# Patient Record
Sex: FEMALE | Race: WHITE | URBAN - METROPOLITAN AREA
[De-identification: names, ages, dates, MRNs, and addresses within clinical notes are randomized per-mention and may not be internally consistent; named-entity substitution may affect disease eponyms.]

---

## 2021-11-18 ENCOUNTER — OFFICE VISIT (OUTPATIENT)
Dept: URBAN - METROPOLITAN AREA CLINIC 76 | Facility: CLINIC | Age: 66
End: 2021-11-18
Payer: MEDICARE

## 2021-11-18 DIAGNOSIS — H16.223 KERATOCONJUNCT SICCA, NOT SPECIFIED AS SJOGREN'S, BILATERAL: Primary | ICD-10-CM

## 2021-11-18 DIAGNOSIS — H10.45 OTHER CHRONIC ALLERGIC CONJUNCTIVITIS: ICD-10-CM

## 2021-11-18 PROCEDURE — 99203 OFFICE O/P NEW LOW 30 MIN: CPT | Performed by: OPTOMETRIST

## 2021-11-18 RX ORDER — FLUOROMETHOLONE 1 MG/ML
0.1 % SOLUTION/ DROPS OPHTHALMIC
Qty: 10 | Refills: 0 | Status: INACTIVE
Start: 2021-11-18 | End: 2021-11-19

## 2021-11-18 ASSESSMENT — INTRAOCULAR PRESSURE
OD: 13
OS: 13

## 2021-11-18 NOTE — IMPRESSION/PLAN
Impression: Keratoconjunct sicca, not specified as Sjogren's, bilateral: I89.987. Bilateral. Plan: Discussed diagnosis in detail with patient. Warm compresses with lid massage from top / down, bottom / up, and sweep from inside / out x 2. Patient instructed to use emulsive base lubricant 3-4 x a day. Increase omega foods/nuts and/or Borage/Fish/Krill oil supplement 1500-2500mg capsule orally per day. Continue Xiidra OU. Start FML TID OU. RTC 1 month.

## 2022-01-03 ENCOUNTER — OFFICE VISIT (OUTPATIENT)
Dept: URBAN - METROPOLITAN AREA CLINIC 76 | Facility: CLINIC | Age: 67
End: 2022-01-03
Payer: MEDICARE

## 2022-01-03 PROCEDURE — 99213 OFFICE O/P EST LOW 20 MIN: CPT | Performed by: OPTOMETRIST

## 2022-01-03 ASSESSMENT — INTRAOCULAR PRESSURE
OD: 12
OS: 12

## 2022-01-03 NOTE — IMPRESSION/PLAN
Impression: Keratoconjunct sicca, not specified as Sjogren's, bilateral: G83.101. Bilateral. improvement OU. Plan: Discussed diagnosis in detail with patient. Warm compresses with lid massage from top / down, bottom / up, and sweep from inside / out x 2. Patient instructed to use emulsive base lubricant 3-4 x a day. Increase omega foods/nuts and/or Borage/Fish/Krill oil supplement 1500-2500mg capsule orally per day. Continue Xiidra BID OU and FML QD OD x 2 weeks then d/c. Start Vista Dry eye formula BID PO.

## 2022-07-12 ENCOUNTER — OFFICE VISIT (OUTPATIENT)
Dept: URBAN - METROPOLITAN AREA CLINIC 76 | Facility: CLINIC | Age: 67
End: 2022-07-12
Payer: MEDICARE

## 2022-07-12 DIAGNOSIS — H16.223 KERATOCONJUNCT SICCA, NOT SPECIFIED AS SJOGREN'S, BILATERAL: Primary | ICD-10-CM

## 2022-07-12 PROCEDURE — 99213 OFFICE O/P EST LOW 20 MIN: CPT | Performed by: OPTOMETRIST

## 2022-07-12 ASSESSMENT — INTRAOCULAR PRESSURE
OD: 12
OS: 12

## 2022-07-12 NOTE — IMPRESSION/PLAN
Impression: Keratoconjunct sicca, not specified as Sjogren's, bilateral: H74.635. Bilateral. improvement OU. Plan: Discussed diagnosis in detail with patient. Continue warm compresses with lid massage from top / down, bottom / up, and sweep from inside / out x 2. Patient instructed to use emulsive base lubricant 3-4 x a day. Increase omega foods/nuts and/or Borage/Fish/Krill oil supplement 1500-2500mg capsule orally per day. Continue Xiidra BID OU and Vista Dry eye formula BID PO. Recommend scheduling CEE under vision ins.  or if no vision coverage keep refraction only appointment and schedule 6 mos DE.

## 2022-07-28 ENCOUNTER — TESTING ONLY (OUTPATIENT)
Dept: URBAN - METROPOLITAN AREA CLINIC 76 | Facility: CLINIC | Age: 67
End: 2022-07-28

## 2022-07-28 DIAGNOSIS — H52.4 PRESBYOPIA: Primary | ICD-10-CM

## 2022-07-28 ASSESSMENT — VISUAL ACUITY
OS: 20/25
OD: 20/20

## 2022-07-28 ASSESSMENT — KERATOMETRY
OD: 41.13
OS: 40.75

## 2023-01-11 ENCOUNTER — OFFICE VISIT (OUTPATIENT)
Dept: URBAN - METROPOLITAN AREA CLINIC 76 | Facility: CLINIC | Age: 68
End: 2023-01-11
Payer: MEDICARE

## 2023-01-11 DIAGNOSIS — H53.2 DIPLOPIA: Primary | ICD-10-CM

## 2023-01-11 DIAGNOSIS — H16.223 KERATOCONJUNCT SICCA, NOT SPECIFIED AS SJOGREN'S, BILATERAL: ICD-10-CM

## 2023-01-11 DIAGNOSIS — H40.1131 PRIMARY OPEN-ANGLE GLAUCOMA, BILATERAL, MILD STAGE: ICD-10-CM

## 2023-01-11 PROCEDURE — 76514 ECHO EXAM OF EYE THICKNESS: CPT | Performed by: OPTOMETRIST

## 2023-01-11 PROCEDURE — 92133 CPTRZD OPH DX IMG PST SGM ON: CPT | Performed by: OPTOMETRIST

## 2023-01-11 PROCEDURE — 99214 OFFICE O/P EST MOD 30 MIN: CPT | Performed by: OPTOMETRIST

## 2023-01-11 RX ORDER — LATANOPROST 50 UG/ML
0.005 % SOLUTION OPHTHALMIC
Qty: 2.5 | Refills: 0 | Status: ACTIVE
Start: 2023-01-11

## 2023-01-11 ASSESSMENT — INTRAOCULAR PRESSURE
OD: 14
OS: 13

## 2023-01-11 ASSESSMENT — KERATOMETRY
OD: 40.88
OS: 40.75

## 2023-01-11 NOTE — IMPRESSION/PLAN
Impression: Diplopia: H53.2. Bilateral. s/p strabismus sx OU 1989. Hx of MS. Pt's last MRI did not detect any new lesions. Possibly secondary to vascular disease? Plan: Discussed with patient. Discussed prism in gls vs monitoring w/o treatment. Pt elects to continue monitoring w/o treatment. Recommend patient see PCP for vascular work up. Recommend patient take a low dose aspirin to help with any vascular issues. Pt to call with any concerns.

## 2023-01-11 NOTE — IMPRESSION/PLAN
Impression: Primary open-angle glaucoma, bilateral, mild stage: H40.1131 Bilateral.  Family hx of glaucoma - mother and brother. ON appearance. Ordered and performed RNFL OCT and Pachs today. 1/11/23 Pachs: normal OU
1/11/23 RNFL OCT: OU mild NFL thinning sup > inf. Plan: Discussed with patient. Start Latanoprost QHS OU. Need baseline VF at next visit. Pt to call with any questions or concerns. Educated patient of the correlation between sleep apnea and glaucoma. Recommended sleep study with PCP to rule out sleep apnea. Apply pressure to tear ducts after instilling drops, then close eyes and roll eyes around for 10 sec. Wait 5 min between drops.

## 2023-01-11 NOTE — IMPRESSION/PLAN
Impression: Keratoconjunct sicca, not specified as Sjogren's, bilateral: Y24.037. Bilateral. improvement OU. Plan: Discussed diagnosis in detail with patient. Continue warm compresses with lid massage from top / down, bottom / up, and sweep from inside / out x 2. Patient instructed to use emulsive base lubricant 3-4 x a day. Increase omega foods/nuts and/or Borage/Fish/Krill oil supplement 1500-2500mg capsule orally per day. Continue Xiidra BID OU and Vista Dry eye formula BID PO.

## 2023-02-08 ENCOUNTER — OFFICE VISIT (OUTPATIENT)
Dept: URBAN - METROPOLITAN AREA CLINIC 76 | Facility: CLINIC | Age: 68
End: 2023-02-08
Payer: MEDICARE

## 2023-02-08 DIAGNOSIS — H25.13 AGE-RELATED NUCLEAR CATARACT, BILATERAL: ICD-10-CM

## 2023-02-08 DIAGNOSIS — Z98.890 OTHER SPECIFIED POSTPROCEDURAL STATES: ICD-10-CM

## 2023-02-08 DIAGNOSIS — H40.1131 PRIMARY OPEN-ANGLE GLAUCOMA, BILATERAL, MILD STAGE: Primary | ICD-10-CM

## 2023-02-08 DIAGNOSIS — H16.223 KERATOCONJUNCT SICCA, NOT SPECIFIED AS SJOGREN'S, BILATERAL: ICD-10-CM

## 2023-02-08 PROCEDURE — 92083 EXTENDED VISUAL FIELD XM: CPT | Performed by: OPTOMETRIST

## 2023-02-08 PROCEDURE — 99213 OFFICE O/P EST LOW 20 MIN: CPT | Performed by: OPTOMETRIST

## 2023-02-08 ASSESSMENT — INTRAOCULAR PRESSURE
OD: 14
OS: 12

## 2023-02-08 NOTE — IMPRESSION/PLAN
Impression: Keratoconjunct sicca, not specified as Sjogren's, bilateral: N31.575. Bilateral. improvement OU. Plan: Discussed diagnosis in detail with patient. Continue warm compresses with lid massage from top / down, bottom / up, and sweep from inside / out x 2. Patient instructed to use emulsive base lubricant 3-4 x a day. Increase omega foods/nuts and/or Borage/Fish/Krill oil supplement 1500-2500mg capsule orally per day. Continue Xiidra BID OU and Vista Dry eye formula BID PO.

## 2023-02-08 NOTE — IMPRESSION/PLAN
Impression: Primary open-angle glaucoma, bilateral, mild stage: H40.1131. Bilateral. based on cd's
positive family hx of glc. IOP good OU today Plan: Discussed diagnosis in detail. Continue Latanoprost QHS OU. Went over proper gtt instillation. Pt to call with concerns.

## 2023-03-31 ENCOUNTER — OFFICE VISIT (OUTPATIENT)
Dept: URBAN - METROPOLITAN AREA CLINIC 76 | Facility: CLINIC | Age: 68
End: 2023-03-31
Payer: MEDICARE

## 2023-03-31 DIAGNOSIS — H53.2 DIPLOPIA: Primary | ICD-10-CM

## 2023-03-31 DIAGNOSIS — H25.11 AGE-RELATED NUCLEAR CATARACT, RIGHT EYE: ICD-10-CM

## 2023-03-31 DIAGNOSIS — H25.042 POSTERIOR SUBCAPSULAR POLAR AGE-RELATED CATARACT, LEFT EYE: ICD-10-CM

## 2023-03-31 PROCEDURE — 99213 OFFICE O/P EST LOW 20 MIN: CPT | Performed by: OPTOMETRIST

## 2023-03-31 ASSESSMENT — INTRAOCULAR PRESSURE
OS: 10
OD: 10

## 2023-03-31 NOTE — IMPRESSION/PLAN
Impression: Posterior subcapsular polar age-related cataract, left eye: H25.042 Left. Plan: Cataracts account for the patient's complaints. Patient understands changing glasses will not improve vision. Recommend keisha boone/ Dr. Nikole Jones or Dr. Martir Paul for cataract surgery.

## 2023-03-31 NOTE — IMPRESSION/PLAN
Impression: Diplopia: H53.2 Bilateral. Plan: Discussed options with patient. [facility] cataract in OS could be cause of Diplopia worsening.   Recommend Cat Sx first before strabismus referral.

## 2023-04-05 ENCOUNTER — PRE-OPERATIVE VISIT (OUTPATIENT)
Dept: URBAN - METROPOLITAN AREA CLINIC 76 | Facility: CLINIC | Age: 68
End: 2023-04-05
Payer: MEDICARE

## 2023-04-05 DIAGNOSIS — H25.13 AGE-RELATED NUCLEAR CATARACT, BILATERAL: Primary | ICD-10-CM

## 2023-04-05 PROCEDURE — 92025 CPTRIZED CORNEAL TOPOGRAPHY: CPT | Performed by: STUDENT IN AN ORGANIZED HEALTH CARE EDUCATION/TRAINING PROGRAM

## 2023-04-05 PROCEDURE — 92134 CPTRZ OPH DX IMG PST SGM RTA: CPT | Performed by: STUDENT IN AN ORGANIZED HEALTH CARE EDUCATION/TRAINING PROGRAM

## 2023-04-05 ASSESSMENT — PACHYMETRY
OD: 3.25
OD: 25.14
OS: 3.30
OS: 25.18

## 2023-04-11 ENCOUNTER — OFFICE VISIT (OUTPATIENT)
Dept: URBAN - METROPOLITAN AREA CLINIC 76 | Facility: CLINIC | Age: 68
End: 2023-04-11
Payer: MEDICARE

## 2023-04-11 DIAGNOSIS — H53.2 DIPLOPIA: ICD-10-CM

## 2023-04-11 DIAGNOSIS — H40.1131 PRIMARY OPEN-ANGLE GLAUCOMA, BILATERAL, MILD STAGE: ICD-10-CM

## 2023-04-11 DIAGNOSIS — H25.13 AGE-RELATED NUCLEAR CATARACT, BILATERAL: Primary | ICD-10-CM

## 2023-04-11 DIAGNOSIS — H52.213 IRREGULAR ASTIGMATISM, BILATERAL: ICD-10-CM

## 2023-04-11 DIAGNOSIS — Z98.890 OTHER SPECIFIED POSTPROCEDURAL STATES: ICD-10-CM

## 2023-04-11 PROCEDURE — 99205 OFFICE O/P NEW HI 60 MIN: CPT | Performed by: STUDENT IN AN ORGANIZED HEALTH CARE EDUCATION/TRAINING PROGRAM

## 2023-04-11 RX ORDER — OFLOXACIN 3 MG/ML
0.3 % SOLUTION/ DROPS OPHTHALMIC
Qty: 5 | Refills: 1 | Status: ACTIVE
Start: 2023-04-11

## 2023-04-11 RX ORDER — PREDNISOLONE ACETATE 10 MG/ML
1 % SUSPENSION/ DROPS OPHTHALMIC
Qty: 10 | Refills: 1 | Status: ACTIVE
Start: 2023-04-11

## 2023-04-11 ASSESSMENT — INTRAOCULAR PRESSURE
OS: 14
OD: 15

## 2023-04-11 ASSESSMENT — KERATOMETRY
OS: 40.75
OD: 41.00

## 2023-04-11 ASSESSMENT — VISUAL ACUITY
OS: 20/30
OD: 20/25

## 2023-04-11 NOTE — IMPRESSION/PLAN
Impression: Other specified postprocedural states: Z98.890. Bilateral. s/p LASIK OU. Plan: Discussed condition and added risks associated with CE IOL due to previous LASIK.

## 2023-04-11 NOTE — IMPRESSION/PLAN
Impression: Diplopia: H53.2 Bilateral. h/x strab sx. Plan: Discussed condition in great detail. Advised may need some prism in glasses after CE IOL.  Recommend Cat Sx first before strabismus referral.

## 2023-04-11 NOTE — IMPRESSION/PLAN
Impression: Primary open-angle glaucoma, bilateral, mild stage: H40.1131. IOP good OU today. Reviewed previous VF/OCT. Plan: Discussed condition. Continue Latanoprost QHS OU. Monitor closely with Dr. Dylon Stone. Recommend Istent inject OU to help aid in glaucoma control. Discussed all risks and benefits of Istent. Pt wishes to proceed. Schedule Istent inject OS then OD.

## 2023-04-11 NOTE — IMPRESSION/PLAN
Impression: Age-related nuclear cataract, bilateral: H25.13. Plan: Discussed cataracts, treatment options, and surgical risks/benefits with patient including bleeding, infection, capsular break, glaucoma, corneal clouding. Patient understands there are tradeoffs to each intraocular lens choice and glasses may still be necessary after surgery. Pt understands that multifocal intraocular lenses have side effects including but not limited to halos/glare/difficulty in dim lighting and with intermediate vision. The patient is bothered by the symptoms of their cataract which is not correctable with a change in glasses and their ADL's are impaired. Patient elects surgical treatment and wishes to proceed. There is reasonable expectation that both the patient's visual and functional status will improve after surgery. Post op care to be patient's choice of provider/clinic and may include comanagement with a local or distant optometrist/ophthalmologist. Recommend ORA. No Dextenza recommended due to previous DCR OD. Pt will need PO drops. Lens Recommendation: [employer]: OK for [employer] Aim OD: -0.25. Aim OS: -0.25. [employer]: OS then OD Notes: s/p myopic lasik OU. 
Need to re-calc w/ Lalo True K

## 2023-04-24 ENCOUNTER — SURGERY (OUTPATIENT)
Dept: URBAN - METROPOLITAN AREA SURGERY 47 | Facility: SURGERY | Age: 68
End: 2023-04-24
Payer: MEDICARE

## 2023-04-24 DIAGNOSIS — H40.1131 PRIMARY OPEN-ANGLE GLAUCOMA, BILATERAL, MILD STAGE: ICD-10-CM

## 2023-04-24 DIAGNOSIS — H25.13 AGE-RELATED NUCLEAR CATARACT, BILATERAL: Primary | ICD-10-CM

## 2023-04-24 PROCEDURE — PR1CP PR1CP: CUSTOM | Performed by: STUDENT IN AN ORGANIZED HEALTH CARE EDUCATION/TRAINING PROGRAM

## 2023-04-24 PROCEDURE — 66991 XCAPSL CTRC RMVL INSJ 1+: CPT | Performed by: STUDENT IN AN ORGANIZED HEALTH CARE EDUCATION/TRAINING PROGRAM

## 2023-04-25 ENCOUNTER — POST-OPERATIVE VISIT (OUTPATIENT)
Dept: URBAN - METROPOLITAN AREA CLINIC 76 | Facility: CLINIC | Age: 68
End: 2023-04-25
Payer: MEDICARE

## 2023-04-25 DIAGNOSIS — Z48.810 ENCOUNTER FOR SURGICAL AFTERCARE FOLLOWING SURGERY ON A SENSE ORGAN: Primary | ICD-10-CM

## 2023-04-25 ASSESSMENT — INTRAOCULAR PRESSURE
OD: 7
OS: 7

## 2023-04-25 NOTE — IMPRESSION/PLAN
Impression: S/P Cataract Extraction by phacoemulsification with IOL placement; iStent OS - 1 Day. Encounter for surgical aftercare following surgery on a sense organ  Z48.810. Plan: Recommend using At's for comfort. Continue Ofloxacin QID and Pred QID OS. Pt to call with any concerns.

## 2023-05-01 DIAGNOSIS — Z48.810 ENCOUNTER FOR SURGICAL AFTERCARE FOLLOWING SURGERY ON THE SENSE ORGANS: ICD-10-CM

## 2023-05-01 DIAGNOSIS — H52.4 PRESBYOPIA: Primary | ICD-10-CM

## 2023-05-01 DIAGNOSIS — Z96.1 PRESENCE OF INTRAOCULAR LENS: ICD-10-CM

## 2023-05-01 ASSESSMENT — INTRAOCULAR PRESSURE
OD: 10
OS: 10

## 2023-05-01 ASSESSMENT — VISUAL ACUITY
OS: 20/30
OD: 20/20

## 2023-05-01 NOTE — IMPRESSION/PLAN
Impression: S/P Cataract Extraction by phacoemulsification with IOL placement; iStent OS - 7 Days. Presence of intraocular lens  Z96.1. Plan: Discussed. Recommend using At's for comfort. Continue Ofloxacin and Pred QID OS. Pt to call with concerns.

## 2023-05-08 ENCOUNTER — SURGERY (OUTPATIENT)
Dept: URBAN - METROPOLITAN AREA SURGERY 47 | Facility: SURGERY | Age: 68
End: 2023-05-08
Payer: MEDICARE

## 2023-05-08 DIAGNOSIS — H40.1111 PRIMARY OPEN-ANGLE GLAUCOMA, RIGHT EYE, MILD STAGE: ICD-10-CM

## 2023-05-08 DIAGNOSIS — H25.11 AGE-RELATED NUCLEAR CATARACT, RIGHT EYE: Primary | ICD-10-CM

## 2023-05-08 PROCEDURE — 66991 XCAPSL CTRC RMVL INSJ 1+: CPT | Performed by: STUDENT IN AN ORGANIZED HEALTH CARE EDUCATION/TRAINING PROGRAM

## 2023-05-08 PROCEDURE — PR1CP PR1CP: CUSTOM | Performed by: STUDENT IN AN ORGANIZED HEALTH CARE EDUCATION/TRAINING PROGRAM

## 2023-05-09 ENCOUNTER — POST-OPERATIVE VISIT (OUTPATIENT)
Dept: URBAN - METROPOLITAN AREA CLINIC 76 | Facility: CLINIC | Age: 68
End: 2023-05-09
Payer: MEDICARE

## 2023-05-09 DIAGNOSIS — Z96.1 PRESENCE OF INTRAOCULAR LENS: Primary | ICD-10-CM

## 2023-05-09 RX ORDER — LIFITEGRAST 50 MG/ML
5 % SOLUTION/ DROPS OPHTHALMIC
Qty: 180 | Refills: 3 | Status: ACTIVE
Start: 2023-05-09

## 2023-05-09 NOTE — IMPRESSION/PLAN
Impression: S/P Cataract Extraction by phacoemulsification with IOL placement; ORA; iStent OD - 1 Day. Presence of intraocular lens  Z96.1. Plan: Recommend using At's for comfort. Continue Pred and Ofloxacin QID. Pt to call with any concerns.

## 2023-05-15 ENCOUNTER — POST-OPERATIVE VISIT (OUTPATIENT)
Dept: URBAN - METROPOLITAN AREA CLINIC 76 | Facility: CLINIC | Age: 68
End: 2023-05-15
Payer: MEDICARE

## 2023-05-15 DIAGNOSIS — H52.4 PRESBYOPIA: Primary | ICD-10-CM

## 2023-05-15 DIAGNOSIS — Z96.1 PRESENCE OF INTRAOCULAR LENS: ICD-10-CM

## 2023-05-15 RX ORDER — SODIUM CHLORIDE 50 MG/ML
5 % SOLUTION OPHTHALMIC
Qty: 10 | Refills: 3 | Status: INACTIVE
Start: 2023-05-15 | End: 2023-05-15

## 2023-05-15 RX ORDER — SODIUM CHLORIDE 50 MG/ML
5 % SOLUTION OPHTHALMIC
Qty: 10 | Refills: 3 | Status: ACTIVE
Start: 2023-05-15

## 2023-05-15 ASSESSMENT — VISUAL ACUITY
OS: 20/30
OD: 20/25

## 2023-05-15 ASSESSMENT — INTRAOCULAR PRESSURE
OS: 10
OD: 10

## 2023-05-15 NOTE — IMPRESSION/PLAN
Impression:  Presence of intraocular lens  Z96.1. Plan: Discussed. Recommend using At's for comfort. Continue Ketorolac BID OD. Pt to call with concerns. Start Joe 128 QID OS.

## 2023-06-06 ENCOUNTER — POST-OPERATIVE VISIT (OUTPATIENT)
Dept: URBAN - METROPOLITAN AREA CLINIC 76 | Facility: CLINIC | Age: 68
End: 2023-06-06
Payer: MEDICARE

## 2023-06-06 DIAGNOSIS — H52.4 PRESBYOPIA: Primary | ICD-10-CM

## 2023-06-06 DIAGNOSIS — Z96.1 PRESENCE OF INTRAOCULAR LENS: ICD-10-CM

## 2023-06-06 PROCEDURE — 92015 DETERMINE REFRACTIVE STATE: CPT | Performed by: OPTOMETRIST

## 2023-06-06 ASSESSMENT — VISUAL ACUITY
OD: 20/25
OS: 20/25

## 2023-06-06 NOTE — IMPRESSION/PLAN
Impression: S/P Cataract Extraction by phacoemulsification with IOL placement; ORA; iStent OD - 29 Days. Presence of intraocular lens  Z96.1. 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 Plan: Difficulty getting final Rx today due to diplopia. Recommend consult with Dr. Verenice Houston for further treatment. Bear/Nitin for second opinion if needed. Pt to call with concerns. Continue artificial tears.

## 2023-06-08 ENCOUNTER — OFFICE VISIT (OUTPATIENT)
Dept: URBAN - METROPOLITAN AREA CLINIC 76 | Facility: CLINIC | Age: 68
End: 2023-06-08
Payer: MEDICARE

## 2023-06-08 DIAGNOSIS — H26.493 OTHER SECONDARY CATARACT, BILATERAL: ICD-10-CM

## 2023-06-08 DIAGNOSIS — H40.1131 PRIMARY OPEN-ANGLE GLAUCOMA, BILATERAL, MILD STAGE: Primary | ICD-10-CM

## 2023-06-08 PROCEDURE — 99213 OFFICE O/P EST LOW 20 MIN: CPT | Performed by: OPTOMETRIST

## 2023-06-08 ASSESSMENT — INTRAOCULAR PRESSURE
OD: 15
OS: 14

## 2023-06-08 NOTE — IMPRESSION/PLAN
Impression: Primary open-angle glaucoma, bilateral, mild stage: H40.1131. IOP good OU today. Plan: Discussed condition. Continue Latanoprost QHS OU. Monitor closely with Dr. Jose Antonio Enriquez.

## 2023-06-08 NOTE — IMPRESSION/PLAN
Impression: Other secondary cataract, bilateral: H26.493. Bilateral.
PCO OS>OD
intensifying double vision Plan: Discussed diagnosis with patient and treatment options. Recommend YAG consult with Dr. Joni Lynn just prior to insurance qualifying her for treatment.

## 2023-07-18 ENCOUNTER — OFFICE VISIT (OUTPATIENT)
Dept: URBAN - METROPOLITAN AREA CLINIC 76 | Facility: CLINIC | Age: 68
End: 2023-07-18
Payer: MEDICARE

## 2023-07-18 DIAGNOSIS — H26.493 OTHER SECONDARY CATARACT, BILATERAL: Primary | ICD-10-CM

## 2023-07-18 DIAGNOSIS — Z96.1 PRESENCE OF INTRAOCULAR LENS: ICD-10-CM

## 2023-07-18 DIAGNOSIS — H40.1131 PRIMARY OPEN-ANGLE GLAUCOMA, BILATERAL, MILD STAGE: ICD-10-CM

## 2023-07-18 PROCEDURE — 99215 OFFICE O/P EST HI 40 MIN: CPT | Performed by: STUDENT IN AN ORGANIZED HEALTH CARE EDUCATION/TRAINING PROGRAM

## 2023-07-18 ASSESSMENT — INTRAOCULAR PRESSURE
OD: 10
OS: 10

## 2023-07-18 ASSESSMENT — VISUAL ACUITY
OS: 20/30
OD: 20/20

## 2023-07-18 NOTE — IMPRESSION/PLAN
Impression: Other secondary cataract, bilateral: H26.493. Plan: Capsular opacification is believed to be contributing to visual impairment and there is reasonable expectation that laser treatment will improve vision. A discussion of the procedure, risks, and benefits with the patient was performed at length and consent will be obtained prior to procedure. Advised the patient that treatment can increase floaters, that these floaters may be permanent, pt understands. Patient desires and agrees to proceed with treatment. Schedule YAG OS only.

## 2023-07-18 NOTE — IMPRESSION/PLAN
Impression: Primary open-angle glaucoma, bilateral, mild stage: H40.1131. IOP good OU today. Plan: Discussed condition. Continue Latanoprost QHS OU. Monitor closely with Dr. Jeny Bhandari.

## 2023-08-14 ENCOUNTER — SURGERY (OUTPATIENT)
Dept: URBAN - METROPOLITAN AREA SURGERY 47 | Facility: SURGERY | Age: 68
End: 2023-08-14
Payer: MEDICARE

## 2023-08-14 PROCEDURE — 66821 AFTER CATARACT LASER SURGERY: CPT | Performed by: STUDENT IN AN ORGANIZED HEALTH CARE EDUCATION/TRAINING PROGRAM

## 2023-08-21 ENCOUNTER — POST-OPERATIVE VISIT (OUTPATIENT)
Dept: URBAN - METROPOLITAN AREA CLINIC 76 | Facility: CLINIC | Age: 68
End: 2023-08-21
Payer: MEDICARE

## 2023-08-21 DIAGNOSIS — Z48.810 ENCOUNTER FOR SURGICAL AFTERCARE FOLLOWING SURGERY ON A SENSE ORGAN: ICD-10-CM

## 2023-08-21 DIAGNOSIS — H52.4 PRESBYOPIA: Primary | ICD-10-CM

## 2023-08-21 PROCEDURE — 99024 POSTOP FOLLOW-UP VISIT: CPT | Performed by: OPTOMETRIST

## 2023-08-21 ASSESSMENT — INTRAOCULAR PRESSURE
OD: 8
OS: 9

## 2023-08-21 ASSESSMENT — VISUAL ACUITY
OS: 20/25
OD: 20/20

## 2023-11-07 ENCOUNTER — OFFICE VISIT (OUTPATIENT)
Dept: URBAN - METROPOLITAN AREA CLINIC 76 | Facility: CLINIC | Age: 68
End: 2023-11-07
Payer: MEDICARE

## 2023-11-07 DIAGNOSIS — H26.491 OTHER SECONDARY CATARACT, RIGHT EYE: ICD-10-CM

## 2023-11-07 DIAGNOSIS — Z96.1 PRESENCE OF INTRAOCULAR LENS: ICD-10-CM

## 2023-11-07 DIAGNOSIS — H02.889 MEIBOMIAN GLAND DYSFUNCTION OF EYE: ICD-10-CM

## 2023-11-07 DIAGNOSIS — Z98.890 OTHER SPECIFIED POSTPROCEDURAL STATES: ICD-10-CM

## 2023-11-07 DIAGNOSIS — H53.2 DIPLOPIA: ICD-10-CM

## 2023-11-07 DIAGNOSIS — H40.1131 PRIMARY OPEN-ANGLE GLAUCOMA, BILATERAL, MILD STAGE: Primary | ICD-10-CM

## 2023-11-07 PROCEDURE — 92133 CPTRZD OPH DX IMG PST SGM ON: CPT | Performed by: OPTOMETRIST

## 2023-11-07 PROCEDURE — 99214 OFFICE O/P EST MOD 30 MIN: CPT | Performed by: OPTOMETRIST

## 2023-11-07 ASSESSMENT — INTRAOCULAR PRESSURE
OD: 11
OS: 11

## 2024-03-29 ENCOUNTER — OFFICE VISIT (OUTPATIENT)
Dept: URBAN - METROPOLITAN AREA CLINIC 76 | Facility: CLINIC | Age: 69
End: 2024-03-29
Payer: MEDICARE

## 2024-03-29 DIAGNOSIS — H02.889 MEIBOMIAN GLAND DYSFUNCTION OF EYE: ICD-10-CM

## 2024-03-29 DIAGNOSIS — H53.2 DIPLOPIA: ICD-10-CM

## 2024-03-29 DIAGNOSIS — H40.1131 PRIMARY OPEN-ANGLE GLAUCOMA, BILATERAL, MILD STAGE: Primary | ICD-10-CM

## 2024-03-29 PROCEDURE — 99213 OFFICE O/P EST LOW 20 MIN: CPT | Performed by: OPTOMETRIST

## 2024-03-29 PROCEDURE — 92083 EXTENDED VISUAL FIELD XM: CPT | Performed by: OPTOMETRIST

## 2024-03-29 ASSESSMENT — INTRAOCULAR PRESSURE
OS: 14
OD: 14

## 2024-04-18 ENCOUNTER — OFFICE VISIT (OUTPATIENT)
Dept: URBAN - METROPOLITAN AREA CLINIC 76 | Facility: CLINIC | Age: 69
End: 2024-04-18
Payer: MEDICARE

## 2024-04-18 DIAGNOSIS — Z98.890 OTHER SPECIFIED POSTPROCEDURAL STATES: ICD-10-CM

## 2024-04-18 DIAGNOSIS — H16.211 EXPOSURE KERATOCONJUNCTIVITIS, RIGHT EYE: Primary | ICD-10-CM

## 2024-04-18 PROCEDURE — 99213 OFFICE O/P EST LOW 20 MIN: CPT | Performed by: OPTOMETRIST

## 2024-04-18 RX ORDER — FLUOROMETHOLONE 1 MG/ML
0.1 % SOLUTION/ DROPS OPHTHALMIC
Qty: 10 | Refills: 0 | Status: ACTIVE
Start: 2024-04-18

## 2024-04-18 ASSESSMENT — INTRAOCULAR PRESSURE
OD: 12
OS: 12

## 2024-04-18 ASSESSMENT — KERATOMETRY
OD: 42.50
OS: 41.13

## 2024-05-03 ENCOUNTER — OFFICE VISIT (OUTPATIENT)
Dept: URBAN - METROPOLITAN AREA CLINIC 76 | Facility: CLINIC | Age: 69
End: 2024-05-03
Payer: MEDICARE

## 2024-05-03 DIAGNOSIS — H16.211 EXPOSURE KERATOCONJUNCTIVITIS, RIGHT EYE: Primary | ICD-10-CM

## 2024-05-03 PROCEDURE — 99213 OFFICE O/P EST LOW 20 MIN: CPT | Performed by: OPTOMETRIST

## 2024-05-03 ASSESSMENT — INTRAOCULAR PRESSURE
OS: 12
OD: 11

## 2024-09-30 ENCOUNTER — OFFICE VISIT (OUTPATIENT)
Dept: URBAN - METROPOLITAN AREA CLINIC 76 | Facility: CLINIC | Age: 69
End: 2024-09-30
Payer: MEDICARE

## 2024-09-30 DIAGNOSIS — Z98.890 OTHER SPECIFIED POSTPROCEDURAL STATES: ICD-10-CM

## 2024-09-30 DIAGNOSIS — H26.491 OTHER SECONDARY CATARACT, RIGHT EYE: ICD-10-CM

## 2024-09-30 DIAGNOSIS — H40.1131 PRIMARY OPEN-ANGLE GLAUCOMA, BILATERAL, MILD STAGE: Primary | ICD-10-CM

## 2024-09-30 DIAGNOSIS — Z96.1 PRESENCE OF INTRAOCULAR LENS: ICD-10-CM

## 2024-09-30 PROCEDURE — 99213 OFFICE O/P EST LOW 20 MIN: CPT | Performed by: OPTOMETRIST

## 2024-09-30 PROCEDURE — 92133 CPTRZD OPH DX IMG PST SGM ON: CPT | Performed by: OPTOMETRIST

## 2024-09-30 ASSESSMENT — INTRAOCULAR PRESSURE
OS: 9
OD: 11

## 2025-03-06 ENCOUNTER — OFFICE VISIT (OUTPATIENT)
Dept: URBAN - METROPOLITAN AREA CLINIC 76 | Facility: CLINIC | Age: 70
End: 2025-03-06
Payer: MEDICARE

## 2025-03-06 DIAGNOSIS — Z96.1 PRESENCE OF INTRAOCULAR LENS: ICD-10-CM

## 2025-03-06 DIAGNOSIS — H50.89 OTHER SPECIFIED STRABISMUS: ICD-10-CM

## 2025-03-06 DIAGNOSIS — H40.1131 PRIMARY OPEN-ANGLE GLAUCOMA, BILATERAL, MILD STAGE: Primary | ICD-10-CM

## 2025-03-06 DIAGNOSIS — Z98.890 OTHER SPECIFIED POSTPROCEDURAL STATES: ICD-10-CM

## 2025-03-06 ASSESSMENT — INTRAOCULAR PRESSURE
OD: 10
OS: 10

## 2025-06-20 ENCOUNTER — OFFICE VISIT (OUTPATIENT)
Dept: URBAN - METROPOLITAN AREA CLINIC 76 | Facility: CLINIC | Age: 70
End: 2025-06-20
Payer: MEDICARE

## 2025-06-20 DIAGNOSIS — T15.02XA FOREIGN BODY IN CORNEA, LEFT EYE, INITIAL ENCOUNTER: ICD-10-CM

## 2025-06-20 DIAGNOSIS — H16.211 EXPOSURE KERATOCONJUNCTIVITIS, RIGHT EYE: Primary | ICD-10-CM

## 2025-06-20 PROCEDURE — 99214 OFFICE O/P EST MOD 30 MIN: CPT | Performed by: OPTOMETRIST

## 2025-06-20 RX ORDER — NEOMYCIN SULFATE, POLYMYXIN B SULFATE AND DEXAMETHASONE 1; 3.5; 1 MG/ML; MG/ML; [USP'U]/ML
SUSPENSION OPHTHALMIC
Qty: 5 | Refills: 0 | Status: ACTIVE
Start: 2025-06-20

## 2025-06-20 ASSESSMENT — INTRAOCULAR PRESSURE
OD: 9
OS: 9